# Patient Record
Sex: FEMALE | Race: WHITE
[De-identification: names, ages, dates, MRNs, and addresses within clinical notes are randomized per-mention and may not be internally consistent; named-entity substitution may affect disease eponyms.]

---

## 2019-08-01 ENCOUNTER — HOSPITAL ENCOUNTER (OUTPATIENT)
Dept: HOSPITAL 84 - D.US | Age: 44
Discharge: HOME | End: 2019-08-01
Attending: THORACIC SURGERY (CARDIOTHORACIC VASCULAR SURGERY)
Payer: MEDICAID

## 2019-08-01 VITALS — BODY MASS INDEX: 41 KG/M2

## 2019-08-01 DIAGNOSIS — M79.605: Primary | ICD-10-CM

## 2019-08-22 ENCOUNTER — HOSPITAL ENCOUNTER (OUTPATIENT)
Dept: HOSPITAL 84 - D.US | Age: 44
Discharge: HOME | End: 2019-08-22
Attending: FAMILY MEDICINE
Payer: MEDICAID

## 2019-08-22 VITALS — BODY MASS INDEX: 41 KG/M2

## 2019-08-22 DIAGNOSIS — R60.0: ICD-10-CM

## 2019-08-22 DIAGNOSIS — R10.9: Primary | ICD-10-CM

## 2019-08-30 ENCOUNTER — HOSPITAL ENCOUNTER (OUTPATIENT)
Dept: HOSPITAL 84 - D.NM | Age: 44
Discharge: HOME | End: 2019-08-30
Attending: INTERNAL MEDICINE
Payer: COMMERCIAL

## 2019-08-30 VITALS — BODY MASS INDEX: 41 KG/M2

## 2019-08-30 DIAGNOSIS — R10.13: ICD-10-CM

## 2019-08-30 DIAGNOSIS — R10.11: Primary | ICD-10-CM

## 2019-08-30 DIAGNOSIS — R11.2: ICD-10-CM

## 2019-09-17 ENCOUNTER — HOSPITAL ENCOUNTER (OUTPATIENT)
Dept: HOSPITAL 84 - D.OPS | Age: 44
Discharge: HOME | End: 2019-09-17
Attending: SURGERY
Payer: MEDICAID

## 2019-09-17 VITALS — BODY MASS INDEX: 42.88 KG/M2 | HEIGHT: 63 IN | WEIGHT: 242 LBS

## 2019-09-17 VITALS — DIASTOLIC BLOOD PRESSURE: 83 MMHG | SYSTOLIC BLOOD PRESSURE: 146 MMHG

## 2019-09-17 DIAGNOSIS — E66.01: ICD-10-CM

## 2019-09-17 DIAGNOSIS — I10: ICD-10-CM

## 2019-09-17 DIAGNOSIS — Z01.812: ICD-10-CM

## 2019-09-17 DIAGNOSIS — D17.24: ICD-10-CM

## 2019-09-17 DIAGNOSIS — K81.1: Primary | ICD-10-CM

## 2019-09-17 LAB
ANION GAP SERPL CALC-SCNC: 13 MMOL/L (ref 8–16)
BASOPHILS NFR BLD AUTO: 0.4 % (ref 0–2)
BUN SERPL-MCNC: 14 MG/DL (ref 7–18)
CALCIUM SERPL-MCNC: 9.3 MG/DL (ref 8.5–10.1)
CHLORIDE SERPL-SCNC: 102 MMOL/L (ref 98–107)
CO2 SERPL-SCNC: 27.7 MMOL/L (ref 21–32)
CREAT SERPL-MCNC: 0.9 MG/DL (ref 0.6–1.3)
EOSINOPHIL NFR BLD: 1.6 % (ref 0–7)
ERYTHROCYTE [DISTWIDTH] IN BLOOD BY AUTOMATED COUNT: 12.8 % (ref 11.5–14.5)
GLUCOSE SERPL-MCNC: 97 MG/DL (ref 74–106)
HCT VFR BLD CALC: 38.8 % (ref 36–48)
HGB BLD-MCNC: 13.3 G/DL (ref 12–16)
IMM GRANULOCYTES NFR BLD: 0.3 % (ref 0–5)
LYMPHOCYTES NFR BLD AUTO: 39.3 % (ref 15–50)
MCH RBC QN AUTO: 27.7 PG (ref 26–34)
MCHC RBC AUTO-ENTMCNC: 34.3 G/DL (ref 31–37)
MCV RBC: 80.7 FL (ref 80–100)
MONOCYTES NFR BLD: 6.9 % (ref 2–11)
NEUTROPHILS NFR BLD AUTO: 51.5 % (ref 40–80)
OSMOLALITY SERPL CALC.SUM OF ELEC: 278 MOSM/KG (ref 275–300)
PLATELET # BLD: 444 10X3/UL (ref 130–400)
PMV BLD AUTO: 10.5 FL (ref 7.4–10.4)
POTASSIUM SERPL-SCNC: 3.7 MMOL/L (ref 3.5–5.1)
RBC # BLD AUTO: 4.81 10X6/UL (ref 4–5.4)
SODIUM SERPL-SCNC: 139 MMOL/L (ref 136–145)
WBC # BLD AUTO: 7.7 10X3/UL (ref 4.8–10.8)

## 2019-09-17 NOTE — NUR
DR SUMNER NOTIFIED AND REVIEWED PT'S BEHAVIOR AND ASSESSMENT RESULTS. PT IS A
LOW RISK PER DR SUMNER. DR SUMNER STATED TO GIVE RESOURCES TO PT AT TIME OF
DISCHARGE. NO FURTHER ORDERS AT THIS TIME. RESOURCES REVIEWED WITH PT AND SHE
VERBALIZED UNDERSTANDING.

## 2019-09-17 NOTE — OP
PATIENT NAME:  CARLOS BAKER                        MEDICAL RECORD: [id number]
:73                                             LOCATION:[facility]          
                                                         ADMISSION DATE:        
SURGEON:  EL HUFFMAN MD          
 
 
DATE OF OPERATION:  2019
 
PREOPERATIVE DIAGNOSES:
1.  Biliary dyskinesia.
2.  Left 3 cm posterior calf mass.
3.  Hypertension.
4.  Morbid obesity with a BMI of 43.
 
POSTOPERATIVE DIAGNOSES:
1.  Biliary dyskinesia.
2.  Left 3 cm posterior calf mass.
3.  Hypertension.
4.  Morbid obesity with a BMI of 43.
 
PROCEDURE:
1.  Laparoscopic cholecystectomy.
2.  Excision of 3 cm soft tissue left posterior calf mass.
 
SURGEON:  El Huffman MD
 
REPORT OF PROCEDURE:  The patient's abdomen was prepped and draped in sterile
fashion.  A cutdown was made on the superior aspect of the umbilicus, 0 Vicryls
were placed in the fascia bilaterally and the fascia was incised with a
15-blade.  I then bluntly entered the peritoneal cavity and placed a 12-mm
Misbah port.  Under direct visualization, a 5 mm trocar was placed in the
epigastrium and 2 more 5-mm trocars were placed in the right subcostal region. 
The gallbladder was elevated.  There was no sign of any inflammatory changes. 
The cystic artery and cystic duct were dissected free and these were clipped
proximally and distally and ligated in standard fashion.  The gallbladder was
taken off the liver bed and placed into an Endo Catch bag.  We irrigated out the
right upper quadrant and any bleeding was treated with electrocautery.  At this
point, the ports and insufflation were then removed and the gallbladder was
taken out through the umbilicus.  The umbilical fascia was closed with
interrupted 0 Vicryls times 3.  The wounds were then irrigated out with normal
saline and infused with 10 mL of 0.25% Marcaine with epinephrine.  The skin
incisions were closed with subcutaneous 5-0 Monocryl and dressed appropriately. 
We then prepped the posterior aspect of the left medial calf, there was a 3-cm
mass present in the subcutaneous tissues.  The 3 cm incision was made overlying
this mass.  As we came down, there was not a distinct lipomatous lesion present,
but there was lot of firm fatty tissue consistent with more of a pedunculated
lipoma.  The tissue was squeezed out and any of this firm fatty tissue was
removed.  At the conclusion of the case, this mass appeared to be resolved and
the skin was flat.  We inspected the base of the area and did not see any
evidence of any deeper masses or lesions present.  At this point, we irrigated
out the wound with normal saline.  The subcutaneous tissues were reapproximated
with interrupted 3-0 Vicryl and the skin was closed with a running simple 5-0
Monocryl.
 
COMPLICATIONS:  None.
 
CONDITION:  Stable.
 
 
 
 
OPERATIVE REPORT                               [id number]    CARLOS BAKER      
 
 
ANESTHESIA:  General endotracheal and local.
 
BLOOD LOSS:  Minimal.
 
TRANSINT:[id number] Voice Confirmation ID: [id number] DOCUMENT ID: [id number]
                                           
                                           EL HUFFMAN MD          
 
 
 
Electronically Signed by EL HUFFMAN on 19 at 1007
 
 
 
 
 
 
 
 
 
 
 
 
 
 
 
 
 
 
 
 
 
 
 
 
 
 
 
 
 
 
 
 
 
 
 
 
CC: FINN NAVARRETE JR, MD                                        [id number]
DICTATION DATE: 19     :     19      [facility]                                          
[address] [place] [address]

## 2019-12-17 ENCOUNTER — HOSPITAL ENCOUNTER (OUTPATIENT)
Dept: HOSPITAL 84 - D.HCCARDIO | Age: 44
Discharge: HOME | End: 2019-12-17
Attending: INTERNAL MEDICINE
Payer: MEDICAID

## 2019-12-17 VITALS — BODY MASS INDEX: 42.9 KG/M2

## 2019-12-17 DIAGNOSIS — R07.9: Primary | ICD-10-CM

## 2019-12-19 NOTE — ST
PATIENT:CARLOS BAKER                        MEDICAL RECORD: [id number]
SEX: F                                                   LOCATION:[facility]         
ORDER #:                                                 ADMISSION DATE: 12/17/19
AGE OF PATIENT: 44            
 
REFERRING PHYSICIAN:                                                             
 
INTERPRETING PHYSICIAN: DEBRA NAJERA MD             

 
 
DATE OF SERVICE:  12/17/2019
 
PROCEDURE:  Nuclear Stress Test.
 
INDICATION:  Angina, palpitations and hypertension.
 
She was exercised on standard Lexiscan protocol with 27 mCi of sestamibi
injected at peak stress, 9 mCi used previously for rest images.
 
FINDINGS:  Gated SPECT reveals preserved ejection fraction at 66% with good wall
motion and thickening and brightening throughout all segments.  SPECT imaging
Cardiolite was used as myocardial fusion agent.  There is homogeneous uptake
throughout all segments at rest and stress with no evidence of inducible
ischemia or previous infarction.
 
OVERALL IMPRESSION:
1.  This is a normal nuclear stress test with no evidence of inducible ischemia
or previous infarction.
2.  Gated SPECT reveals a preserved ejection fraction at 66%.  In this patient
with ongoing symptomatology, the current scan does not suggest the presence of
hemodynamically significant coronary artery disease.  Evaluate noncardiac
etiology of chest pain.
 
TRANSINT:[id number] Voice Confirmation ID: [id number] DOCUMENT ID: [id number]
 
 
                                           
                                           DEBRA NAJERA MD             
 
 
 
Electronically Signed by DEBRA NAJERA on 12/19/19 at 1525
 
 
 
 
 
 
 
CC: FINN NAVARRETE JR, MD                                        [id number]
DICTATION DATE: 12/17/19 1624     :     12/18/19 0738      [facility] CLI 
                                                                      12/17/19
[facility]                                          
[address] [place] [address]

## 2020-04-20 ENCOUNTER — HOSPITAL ENCOUNTER (OUTPATIENT)
Dept: HOSPITAL 84 - D.HCCECHO | Age: 45
Discharge: HOME | End: 2020-04-20
Attending: INTERNAL MEDICINE
Payer: MEDICAID

## 2020-04-20 VITALS — BODY MASS INDEX: 42.9 KG/M2

## 2020-04-20 DIAGNOSIS — R00.2: Primary | ICD-10-CM

## 2020-05-14 ENCOUNTER — HOSPITAL ENCOUNTER (OUTPATIENT)
Dept: HOSPITAL 84 - D.LAB | Age: 45
Discharge: HOME | End: 2020-05-14
Attending: INTERNAL MEDICINE
Payer: MEDICAID

## 2020-05-14 VITALS — BODY MASS INDEX: 42.9 KG/M2

## 2020-05-14 DIAGNOSIS — K76.0: Primary | ICD-10-CM

## 2020-05-14 LAB
ALBUMIN SERPL-MCNC: 3.5 G/DL (ref 3.4–5)
ALP SERPL-CCNC: 62 U/L (ref 30–120)
ALT SERPL-CCNC: 18 U/L (ref 10–68)
BILIRUB DIRECT SERPL-MCNC: 0.07 MG/DL (ref 0–0.3)
BILIRUB INDIRECT SERPL-MCNC: 0.38 MG/DL (ref 0–1)
BILIRUB SERPL-MCNC: 0.45 MG/DL (ref 0.2–1.3)
GLOBULIN SER-MCNC: 4 G/L
PROT SERPL-MCNC: 7.5 G/DL (ref 6.4–8.2)

## 2021-05-26 ENCOUNTER — HOSPITAL ENCOUNTER (OUTPATIENT)
Dept: HOSPITAL 84 - D.OPS | Age: 46
Discharge: HOME | End: 2021-05-26
Attending: OBSTETRICS & GYNECOLOGY
Payer: MEDICAID

## 2021-05-26 VITALS
HEIGHT: 63 IN | BODY MASS INDEX: 41.64 KG/M2 | WEIGHT: 235 LBS | HEIGHT: 63 IN | WEIGHT: 235 LBS | BODY MASS INDEX: 41.64 KG/M2

## 2021-05-26 VITALS — DIASTOLIC BLOOD PRESSURE: 106 MMHG | SYSTOLIC BLOOD PRESSURE: 157 MMHG

## 2021-05-26 DIAGNOSIS — K76.0: ICD-10-CM

## 2021-05-26 DIAGNOSIS — R39.15: ICD-10-CM

## 2021-05-26 DIAGNOSIS — R33.9: ICD-10-CM

## 2021-05-26 DIAGNOSIS — D17.1: Primary | ICD-10-CM

## 2021-05-26 DIAGNOSIS — I10: ICD-10-CM

## 2021-05-26 DIAGNOSIS — K21.9: ICD-10-CM

## 2021-05-26 LAB
ANION GAP SERPL CALC-SCNC: 14.4 MMOL/L (ref 8–16)
BASOPHILS NFR BLD AUTO: 1 % (ref 0–2)
BUN SERPL-MCNC: 23 MG/DL (ref 7–18)
CALCIUM SERPL-MCNC: 9 MG/DL (ref 8.5–10.1)
CHLORIDE SERPL-SCNC: 105 MMOL/L (ref 98–107)
CO2 SERPL-SCNC: 27.4 MMOL/L (ref 21–32)
CREAT SERPL-MCNC: 0.9 MG/DL (ref 0.6–1.3)
EOSINOPHIL NFR BLD: 4.1 % (ref 0–7)
ERYTHROCYTE [DISTWIDTH] IN BLOOD BY AUTOMATED COUNT: 13.3 % (ref 11.5–14.5)
GLUCOSE SERPL-MCNC: 94 MG/DL (ref 74–106)
HCG SERPL-ACNC: NEGATIVE M[IU]/ML
HCT VFR BLD CALC: 38.3 % (ref 36–48)
HGB BLD-MCNC: 12.7 G/DL (ref 12–16)
LYMPHOCYTES # BLD: 2.8 10X3/UL (ref 1.18–3.74)
LYMPHOCYTES NFR BLD AUTO: 31.1 % (ref 15–50)
MCH RBC QN AUTO: 27.8 PG (ref 26–34)
MCHC RBC AUTO-ENTMCNC: 33.2 G/DL (ref 31–37)
MCV RBC: 83.8 FL (ref 80–100)
MONOCYTES NFR BLD: 7.7 % (ref 2–11)
NEUTROPHILS # BLD: 5.1 10X3/UL (ref 1.56–6.13)
NEUTROPHILS NFR BLD AUTO: 56.1 % (ref 40–80)
OSMOLALITY SERPL CALC.SUM OF ELEC: 288 MOSM/KG (ref 275–300)
PLATELET # BLD: 398 10X3/UL (ref 130–400)
PMV BLD AUTO: 7.9 FL (ref 7.4–10.4)
POTASSIUM SERPL-SCNC: 3.8 MMOL/L (ref 3.5–5.1)
RBC # BLD AUTO: 4.56 10X6/UL (ref 4–5.4)
SODIUM SERPL-SCNC: 143 MMOL/L (ref 136–145)
WBC # BLD AUTO: 9.1 10X3/UL (ref 4.8–10.8)

## 2021-05-26 NOTE — NUR
1202 PT MEDICATED FOR RIGHT UPPER THIGH PAIN. SUTURE SITE CDI WITH
DERMABOND. AREA AROUND INCISION IS SWOLLEN AND BRUISED BUT TISSUE IS
SOFT. PT RATES HER PAIN A 6 OUT OF 10.
1224 PT STATES HER PAIN HAS DROPPED TO A 2 OUT OF 10.
1335 PT WAS ABLE TO VOID WITHOUT DIFFICULTY ON THIS ATTEMPT.
1342 IV DC'D. CATHETER TIP INTACT. NO BLEEDING AT SITE. COBAN
DRESSING APPLIED. DISCHARGE INSTRUCTIONS REVIEWED WITH PT WHO VOICES
UNDERSTANDING OF THESE INSTRUCTIONS.

## 2021-05-26 NOTE — NUR
[employer] PNE LEAD #1901 LOT #[id number] EXP. DATE: 12/14/2022
[employer] PNE LEAD IMPLANT KIT #1701 LOT #[id number] EXP. DATE:
12/31/21